# Patient Record
Sex: FEMALE | URBAN - NONMETROPOLITAN AREA
[De-identification: names, ages, dates, MRNs, and addresses within clinical notes are randomized per-mention and may not be internally consistent; named-entity substitution may affect disease eponyms.]

---

## 2020-07-09 ENCOUNTER — WEB ENCOUNTER (OUTPATIENT)
Dept: URBAN - NONMETROPOLITAN AREA CLINIC 2 | Facility: CLINIC | Age: 55
End: 2020-07-09

## 2020-07-14 ENCOUNTER — OFFICE VISIT (OUTPATIENT)
Dept: URBAN - NONMETROPOLITAN AREA CLINIC 2 | Facility: CLINIC | Age: 55
End: 2020-07-14
Payer: COMMERCIAL

## 2020-07-14 ENCOUNTER — LAB OUTSIDE AN ENCOUNTER (OUTPATIENT)
Dept: URBAN - NONMETROPOLITAN AREA CLINIC 2 | Facility: CLINIC | Age: 55
End: 2020-07-14

## 2020-07-14 DIAGNOSIS — K59.09 OTHER CONSTIPATION: ICD-10-CM

## 2020-07-14 DIAGNOSIS — K60.2 ANAL FISSURE: ICD-10-CM

## 2020-07-14 DIAGNOSIS — Z12.11 COLON CANCER SCREENING: ICD-10-CM

## 2020-07-14 PROCEDURE — 99244 OFF/OP CNSLTJ NEW/EST MOD 40: CPT | Performed by: INTERNAL MEDICINE

## 2020-07-14 PROCEDURE — 99204 OFFICE O/P NEW MOD 45 MIN: CPT | Performed by: INTERNAL MEDICINE

## 2020-07-14 NOTE — HPI-OTHER HISTORIES
Jenn is a 55 YO who presents for evaluation of rectal pain. She states 2 months ago she had to digitally disimpact herself and she feels like she cut her anus. She reports pain with a BM. She is usually constipated and takes magnesium PRN. Since this has happened her stools have been more loose. She was afraid she had a perforation so she started some cipro last week she had left over from a previous rx. Her last colonoscopy was in 2014 by Dr. Davis and normal other than internal hemorrhoids due for repeat in 2024. She does report BRBPR. She tried prep H last night and has significant burning this am. MB

## 2020-07-15 LAB
A/G RATIO: 2.3
ALBUMIN: 4.8
ALKALINE PHOSPHATASE: 51
ALT (SGPT): 13
AST (SGOT): 15
BASO (ABSOLUTE): 0
BASOS: 1
BILIRUBIN, TOTAL: 0.9
BUN/CREATININE RATIO: 12
BUN: 12
CALCIUM: 10.1
CARBON DIOXIDE, TOTAL: 25
CHLORIDE: 104
CREATININE: 1
EGFR IF AFRICN AM: 73
EGFR IF NONAFRICN AM: 63
EOS (ABSOLUTE): 0.1
EOS: 1
GLOBULIN, TOTAL: 2.1
GLUCOSE: 95
HEMATOCRIT: 41.9
HEMATOLOGY COMMENTS:: (no result)
HEMOGLOBIN: 13.9
IMMATURE CELLS: (no result)
IMMATURE GRANS (ABS): 0
IMMATURE GRANULOCYTES: 0
LYMPHS (ABSOLUTE): 0.9
LYMPHS: 17
MCH: 30
MCHC: 33.2
MCV: 90
MONOCYTES(ABSOLUTE): 0.5
MONOCYTES: 9
NEUTROPHILS (ABSOLUTE): 4
NEUTROPHILS: 72
NRBC: (no result)
PLATELETS: 310
POTASSIUM: 5
PROTEIN, TOTAL: 6.9
RBC: 4.64
RDW: 12.8
SODIUM: 143
WBC: 5.5

## 2020-07-30 ENCOUNTER — WEB ENCOUNTER (OUTPATIENT)
Dept: URBAN - NONMETROPOLITAN AREA CLINIC 2 | Facility: CLINIC | Age: 55
End: 2020-07-30

## 2020-07-31 ENCOUNTER — WEB ENCOUNTER (OUTPATIENT)
Dept: URBAN - NONMETROPOLITAN AREA CLINIC 2 | Facility: CLINIC | Age: 55
End: 2020-07-31

## 2020-08-12 ENCOUNTER — OFFICE VISIT (OUTPATIENT)
Dept: URBAN - NONMETROPOLITAN AREA SURGERY CENTER 1 | Facility: SURGERY CENTER | Age: 55
End: 2020-08-12
Payer: COMMERCIAL

## 2020-08-12 DIAGNOSIS — K62.89 ANAL BURNING: ICD-10-CM

## 2020-08-12 DIAGNOSIS — K92.1 BLACK STOOL: ICD-10-CM

## 2020-08-12 PROCEDURE — G8907 PT DOC NO EVENTS ON DISCHARG: HCPCS | Performed by: INTERNAL MEDICINE

## 2020-08-12 PROCEDURE — 45330 DIAGNOSTIC SIGMOIDOSCOPY: CPT | Performed by: INTERNAL MEDICINE

## 2020-08-19 ENCOUNTER — OFFICE VISIT (OUTPATIENT)
Dept: URBAN - METROPOLITAN AREA TELEHEALTH 2 | Facility: TELEHEALTH | Age: 55
End: 2020-08-19
Payer: COMMERCIAL

## 2020-08-19 DIAGNOSIS — Z12.11 COLON CANCER SCREENING: ICD-10-CM

## 2020-08-19 DIAGNOSIS — K59.09 OTHER CONSTIPATION: ICD-10-CM

## 2020-08-19 DIAGNOSIS — K60.2 ANAL FISSURE: ICD-10-CM

## 2020-08-19 PROCEDURE — 1036F TOBACCO NON-USER: CPT | Performed by: INTERNAL MEDICINE

## 2020-08-19 PROCEDURE — G8427 DOCREV CUR MEDS BY ELIG CLIN: HCPCS | Performed by: INTERNAL MEDICINE

## 2020-08-19 PROCEDURE — 3017F COLORECTAL CA SCREEN DOC REV: CPT | Performed by: INTERNAL MEDICINE

## 2020-08-19 PROCEDURE — G9903 PT SCRN TBCO ID AS NON USER: HCPCS | Performed by: INTERNAL MEDICINE

## 2020-08-19 PROCEDURE — 99213 OFFICE O/P EST LOW 20 MIN: CPT | Performed by: INTERNAL MEDICINE

## 2020-08-19 NOTE — HPI-OTHER HISTORIES
7/14/2020 Jenn is a 55 YO who presents for evaluation of rectal pain. She states 2 months ago she had to digitally disimpact herself and she feels like she cut her anus. She reports pain with a BM. She is usually constipated and takes magnesium PRN. Since this has happened her stools have been more loose. She was afraid she had a perforation so she started some cipro last week she had left over from a previous rx. Her last colonoscopy was in 2014 by Dr. Davis and normal other than internal hemorrhoids due for repeat in 2024. She does report BRBPR. She tried prep H last night and has significant burning this am. MB   8/19/2020 Monica presents for flex sig follow up. Her flex sig shows a healed fissure. She has been flaring since. The flagyl ointment didn't help. She is using the nitro/lidocaine ointment with no relief. Her bowels are moving but she is on dulcolax and fiber. This causes explosive diarrhea at times. Today she is struggling with the fissure. MB

## 2020-08-28 ENCOUNTER — WEB ENCOUNTER (OUTPATIENT)
Dept: URBAN - NONMETROPOLITAN AREA CLINIC 2 | Facility: CLINIC | Age: 55
End: 2020-08-28

## 2020-09-04 ENCOUNTER — WEB ENCOUNTER (OUTPATIENT)
Dept: URBAN - NONMETROPOLITAN AREA CLINIC 2 | Facility: CLINIC | Age: 55
End: 2020-09-04

## 2020-09-04 ENCOUNTER — TELEPHONE ENCOUNTER (OUTPATIENT)
Dept: URBAN - METROPOLITAN AREA CLINIC 92 | Facility: CLINIC | Age: 55
End: 2020-09-04

## 2020-09-17 ENCOUNTER — DASHBOARD ENCOUNTERS (OUTPATIENT)
Age: 55
End: 2020-09-17

## 2020-09-17 ENCOUNTER — OFFICE VISIT (OUTPATIENT)
Dept: URBAN - NONMETROPOLITAN AREA CLINIC 2 | Facility: CLINIC | Age: 55
End: 2020-09-17
Payer: COMMERCIAL

## 2020-09-17 ENCOUNTER — LAB OUTSIDE AN ENCOUNTER (OUTPATIENT)
Dept: URBAN - NONMETROPOLITAN AREA CLINIC 2 | Facility: CLINIC | Age: 55
End: 2020-09-17

## 2020-09-17 DIAGNOSIS — K62.89 ANORECTAL PAIN: ICD-10-CM

## 2020-09-17 DIAGNOSIS — K59.09 OTHER CONSTIPATION: ICD-10-CM

## 2020-09-17 DIAGNOSIS — Z12.11 COLON CANCER SCREENING: ICD-10-CM

## 2020-09-17 DIAGNOSIS — K60.2 ANAL FISSURE: ICD-10-CM

## 2020-09-17 PROBLEM — 197232005 ANORECTAL PAIN: Status: ACTIVE | Noted: 2020-09-17

## 2020-09-17 PROBLEM — 305058001: Status: ACTIVE | Noted: 2020-07-14

## 2020-09-17 PROBLEM — 30037006: Status: ACTIVE | Noted: 2020-07-14

## 2020-09-17 PROCEDURE — 99214 OFFICE O/P EST MOD 30 MIN: CPT | Performed by: NURSE PRACTITIONER

## 2020-09-17 PROCEDURE — G8427 DOCREV CUR MEDS BY ELIG CLIN: HCPCS | Performed by: NURSE PRACTITIONER

## 2020-09-17 PROCEDURE — 3017F COLORECTAL CA SCREEN DOC REV: CPT | Performed by: NURSE PRACTITIONER

## 2020-09-17 PROCEDURE — 1036F TOBACCO NON-USER: CPT | Performed by: NURSE PRACTITIONER

## 2020-09-17 NOTE — HPI-TODAY'S VISIT:
7/14/2020 Jenn is a 55 YO who presents for evaluation of rectal pain. She states 2 months ago she had to digitally disimpact herself and she feels like she cut her anus. She reports pain with a BM. She is usually constipated and takes magnesium PRN. Since this has happened her stools have been more loose. She was afraid she had a perforation so she started some cipro last week she had left over from a previous rx. Her last colonoscopy was in 2014 by Dr. Davis and normal other than internal hemorrhoids due for repeat in 2024. She does report BRBPR. She tried prep H last night and has significant burning this am. MB  8/19/2020 Monica presents for flex sig follow up. Her flex sig shows a healed fissure. She has been flaring since. The flagyl ointment didn't help. She is using the nitro/lidocaine ointment with no relief. Her bowels are moving but she is on dulcolax and fiber. This causes explosive diarrhea at times. Today she is struggling with the fissure. MB   9/17/2020 Whit presents for follow-up of anorectal plane, and anal fissure.  Since her last visit she continues to struggle with anorectal pain.  She states her bowels are soft on the bowel regimen, she is taking MiraLAX twice daily with stool softeners.  She has a bowel movement once or twice daily with complete evacuation.  Since her last visit she had another flare of pain, she saw Dr. Savi Nieto who did not recommend surgery.  However after the exam she states that her fissure was flaring more.  She felt that the nitroglycerin ointment worked best, but she felt that the lidocaine burned.  She asked us to call in the nitro ointment without lidocaine which we performed.  She states this did not seem to help as much as the first time.  She went to see a physician at [facility].  On examination he told her that she did have a fissure, and he also recommended hemorrhoid banding.  She does not have any prolapsed hemorrhoids, thrombosed hemorrhoids, or swelling or itching.  Her pain is worse with defecation, sharp and stabbing.  She does not have any significant bleeding.  Her last full colonoscopy was in 2014 by Dr. Davis.  She denies any mucus in her stools or nocturnal bowel movements.  Her flexible sigmoidoscopy earlier this summer by Dr. Sykes reveals a healed anal fissure.  Today she is frustrated with her symptoms, on exam her fissure is at 5:00, however her pain and discomfort are more located at 10:00 on examination.  She feels a fullness with significant discomfort on a daily basis.  MB

## 2020-09-18 ENCOUNTER — TELEPHONE ENCOUNTER (OUTPATIENT)
Dept: URBAN - METROPOLITAN AREA CLINIC 92 | Facility: CLINIC | Age: 55
End: 2020-09-18

## 2020-09-24 ENCOUNTER — WEB ENCOUNTER (OUTPATIENT)
Dept: URBAN - NONMETROPOLITAN AREA CLINIC 2 | Facility: CLINIC | Age: 55
End: 2020-09-24

## 2020-09-25 PROBLEM — 14760008: Status: ACTIVE | Noted: 2020-07-14

## 2020-09-28 ENCOUNTER — WEB ENCOUNTER (OUTPATIENT)
Dept: URBAN - NONMETROPOLITAN AREA CLINIC 2 | Facility: CLINIC | Age: 55
End: 2020-09-28

## 2020-10-13 ENCOUNTER — OFFICE VISIT (OUTPATIENT)
Dept: URBAN - NONMETROPOLITAN AREA CLINIC 2 | Facility: CLINIC | Age: 55
End: 2020-10-13

## 2020-11-06 ENCOUNTER — OFFICE VISIT (OUTPATIENT)
Dept: URBAN - NONMETROPOLITAN AREA CLINIC 13 | Facility: CLINIC | Age: 55
End: 2020-11-06

## 2020-11-19 ENCOUNTER — OFFICE VISIT (OUTPATIENT)
Dept: URBAN - NONMETROPOLITAN AREA CLINIC 2 | Facility: CLINIC | Age: 55
End: 2020-11-19

## 2021-08-28 ENCOUNTER — TELEPHONE ENCOUNTER (OUTPATIENT)
Dept: URBAN - METROPOLITAN AREA CLINIC 13 | Facility: CLINIC | Age: 56
End: 2021-08-28

## 2021-08-29 ENCOUNTER — TELEPHONE ENCOUNTER (OUTPATIENT)
Dept: URBAN - METROPOLITAN AREA CLINIC 13 | Facility: CLINIC | Age: 56
End: 2021-08-29